# Patient Record
Sex: FEMALE | Race: WHITE | NOT HISPANIC OR LATINO | URBAN - METROPOLITAN AREA
[De-identification: names, ages, dates, MRNs, and addresses within clinical notes are randomized per-mention and may not be internally consistent; named-entity substitution may affect disease eponyms.]

---

## 2020-07-29 ENCOUNTER — OFFICE VISIT (OUTPATIENT)
Dept: URBAN - METROPOLITAN AREA CLINIC 33 | Facility: CLINIC | Age: 30
End: 2020-07-29

## 2020-08-11 ENCOUNTER — WEB ENCOUNTER (OUTPATIENT)
Dept: URBAN - METROPOLITAN AREA CLINIC 35 | Facility: CLINIC | Age: 30
End: 2020-08-11

## 2020-08-12 ENCOUNTER — OFFICE VISIT (OUTPATIENT)
Dept: URBAN - METROPOLITAN AREA CLINIC 33 | Facility: CLINIC | Age: 30
End: 2020-08-12

## 2020-08-12 VITALS — HEIGHT: 64 IN | BODY MASS INDEX: 21.17 KG/M2 | WEIGHT: 124 LBS

## 2020-08-12 RX ORDER — FAMOTIDINE 20 MG/1
1 TABLET TABLET, FILM COATED ORAL BID
Qty: 60 TABLET | Refills: 1 | OUTPATIENT
Start: 2020-08-12

## 2020-08-12 RX ORDER — CHOLECALCIFEROL (VITAMIN D3) 100000/G
AS DIRECTED POWDER (GRAM) MISCELLANEOUS
Status: ACTIVE | COMMUNITY

## 2020-08-12 RX ORDER — LORATADINE 10 MG/1
1 TABLET TABLET ORAL ONCE A DAY
Qty: 30 | Status: ACTIVE | COMMUNITY

## 2020-08-12 NOTE — HPI-MIGRATED HPI
;     Abdominal Pain : 30 year old female present today for consult of abdominal pain. Patient had symptoms starting 07/04/20 until 08/01/20.  Patient admits abdominal pain that is located upper epigastric region that would radiate to her back and is described as a burning pain lasting for about 4 hours each episode. Patient stated this occurs mainly at night time.  Patient denies any aggravating factors. Patient denies any alleviating factors.    1-2 BMs every other day, with normal to loose stool. She denies melena, blood, or mucus in stool.  She has tried Pepcid without relief of symptoms. Patient also admits trying Dexilant samples for few days with almost complete relief of her symptoms.   Patient denies the use of antibiotics within the last (3-6) months.  No hospital/ER visits for their pain.  Denies a family history of colon cancer or diseases/esophageal or gastric cancer or diseases.    CT ABD/Pelvis 1/8/2020. CT done for a different type of abdominal pain: No acute CT abnormality identified in lower chest, abdomen and pelvis. Prominent liver reaching the right iliac fossa but no liver lesions or abnormalities found. Probable superior polar renal parenchymal cyst. Degenerative changes in lower lumbar spine.   Labs 7/6/2020: WBC: 8.2, HGB: 14.1, HCT: 45.1, MCV: 88, MCH: 27.4, PLT: 438, CREAT: 1.09, CRP: 3, Sed Rate: 2 LFT's NL  Iron studies: NL, Ferritin: NL, LUKE: Negative, TSH: NL, T4 free: NL, Hepatic function: NL  MD Note:  July 4 th: emesis once after eating a sandwich then had diarrhea. Pain started then as well but pain then got better. July 9 th: saw PCP. It was thought initial symptoms were due to food poisoning  AT present time, patient feels well. No pain/symptoms for 1.5 weeks  No heartburn. No dysphagia. No fever with pain. No J/DU but stool was lighter at the time she was experiencing pain.;

## 2020-08-12 NOTE — EXAM-MIGRATED EXAMINATIONS
GENERAL APPEARANCE: - alert, in no acute distress, well developed, well nourished;   HEAD: - normocephalic, atraumatic;   EYES: - sclera anicteric bilaterally;   ORAL CAVITY: - mucosa moist;   THROAT: - clear;   NECK/THYROID: - neck supple, full range of motion, no cervical lymphadenopathy, no thyroid nodules, no thyromegaly, trachea midline;   SKIN: - no suspicious lesions, warm and dry, no spider angiomata, palmar erythema or icterus;   HEART: - no murmurs, regular rate and rhythm, S1, S2 normal;   LUNGS: - clear to auscultation bilaterally, good air movement, no wheezes, rales, rhonchi;   ABDOMEN: - bowel sounds present, no masses palpable, no organomegaly , no rebound tenderness, soft, nontender, nondistended;   MUSCULOSKELETAL: - normal posture, normal gait and station, no decreased range of motion;   EXTREMITIES: - no clubbing, cyanosis, or edema;   PSYCH: - cooperative with exam, mood/affect full range;

## 2020-08-22 ENCOUNTER — LAB OUTSIDE AN ENCOUNTER (OUTPATIENT)
Dept: URBAN - METROPOLITAN AREA CLINIC 35 | Facility: CLINIC | Age: 30
End: 2020-08-22

## 2020-08-27 LAB — H. PYLORI (EIA) - QDX: NEGATIVE

## 2020-08-28 ENCOUNTER — TELEPHONE ENCOUNTER (OUTPATIENT)
Dept: URBAN - METROPOLITAN AREA CLINIC 35 | Facility: CLINIC | Age: 30
End: 2020-08-28

## 2020-09-08 ENCOUNTER — TELEPHONE ENCOUNTER (OUTPATIENT)
Dept: URBAN - METROPOLITAN AREA CLINIC 35 | Facility: CLINIC | Age: 30
End: 2020-09-08

## 2020-09-09 ENCOUNTER — TELEPHONE ENCOUNTER (OUTPATIENT)
Dept: URBAN - METROPOLITAN AREA CLINIC 35 | Facility: CLINIC | Age: 30
End: 2020-09-09

## 2020-09-09 ENCOUNTER — OFFICE VISIT (OUTPATIENT)
Dept: URBAN - METROPOLITAN AREA CLINIC 33 | Facility: CLINIC | Age: 30
End: 2020-09-09

## 2020-09-09 VITALS — WEIGHT: 123 LBS | BODY MASS INDEX: 21 KG/M2 | HEIGHT: 64 IN

## 2020-09-09 PROBLEM — 83132003: Status: ACTIVE | Noted: 2020-08-12

## 2020-09-09 RX ORDER — LORATADINE 10 MG/1
1 TABLET TABLET ORAL ONCE A DAY
Qty: 30 | Status: ACTIVE | COMMUNITY

## 2020-09-09 RX ORDER — FAMOTIDINE 20 MG/1
1 TABLET TABLET, FILM COATED ORAL BID
Qty: 60 TABLET | Refills: 1 | Status: ON HOLD | COMMUNITY
Start: 2020-08-12

## 2020-09-09 RX ORDER — CHOLECALCIFEROL (VITAMIN D3) 100000/G
AS DIRECTED POWDER (GRAM) MISCELLANEOUS
Status: ACTIVE | COMMUNITY

## 2020-09-09 NOTE — HPI-MIGRATED HPI
;     Abdominal Pain : Patient presents for follow up of abdominal pain, imaging and labs. This is a Tele health Visit. Patient admits improvement of symptoms since last visit .  Patient denies taking Famotidine 20mg daily as advised during last visit. She completed both abdominal RUQ ultrasound and H. Pylori stool test with results noted below.   1-2 BM's per day, with soft stool. Patient admits some rectal itching.   She is currently on abx for sinus infection.    GI Note: epigastric pain is completely resolved. She is currently asymptomatic from upper GI standpoint. Stool for H Pylori was negative. RUQ US: showed normal size of liver with no focal abnormalities. GB and biliary tree were NL. Right kidney was described as mildly echogenic suggesting possibility of chronic medical renal disease. Regarding mention above of rectal itching, she is having regular BM's. Patient wonders if she may have hemorrhoids from pregnancy although does not feel them and has no other hemorrhoidal symptoms.   Of last visit (08/12/2020) 30 year old female present today for consult of abdominal pain. Patient had symptoms starting 07/04/20 until 08/01/20.  Patient admits abdominal pain that is located upper epigastric region that would radiate to her back and is described as a burning pain lasting for about 4 hours each episode. Patient stated this occurs mainly at night time.  Patient denies any aggravating factors. Patient denies any alleviating factors.    1-2 BMs every other day, with normal to loose stool. She denies melena, blood, or mucus in stool.  She has tried Pepcid without relief of symptoms. Patient also admits trying Dexilant samples for few days with almost complete relief of her symptoms.   Patient denies the use of antibiotics within the last (3-6) months.  No hospital/ER visits for their pain.  Denies a family history of colon cancer or diseases/esophageal or gastric cancer or diseases.    CT ABD/Pelvis 1/8/2020. CT done for a different type of abdominal pain: No acute CT abnormality identified in lower chest, abdomen and pelvis. Prominent liver reaching the right iliac fossa but no liver lesions or abnormalities found. Probable superior polar renal parenchymal cyst. Degenerative changes in lower lumbar spine.   Labs 7/6/2020: WBC: 8.2, HGB: 14.1, HCT: 45.1, MCV: 88, MCH: 27.4, PLT: 438, CREAT: 1.09, CRP: 3, Sed Rate: 2 LFT's NL  Iron studies: NL, Ferritin: NL, LUKE: Negative, TSH: NL, T4 free: NL, Hepatic function: NL  MD Note:  July 4 th: emesis once after eating a sandwich then had diarrhea. Pain started then as well but pain then got better. July 9 th: saw PCP. It was thought initial symptoms were due to food poisoning  AT present time, patient feels well. No pain/symptoms for 1.5 weeks  No heartburn. No dysphagia. No fever with pain. No J/DU but stool was lighter at the time she was experiencing pain.;

## 2021-05-05 ENCOUNTER — OFFICE VISIT (OUTPATIENT)
Dept: URBAN - METROPOLITAN AREA CLINIC 35 | Facility: CLINIC | Age: 31
End: 2021-05-05

## 2021-05-05 VITALS
BODY MASS INDEX: 20.66 KG/M2 | WEIGHT: 121 LBS | HEART RATE: 104 BPM | OXYGEN SATURATION: 99 % | DIASTOLIC BLOOD PRESSURE: 80 MMHG | SYSTOLIC BLOOD PRESSURE: 128 MMHG | HEIGHT: 64 IN

## 2021-05-05 RX ORDER — CHOLECALCIFEROL (VITAMIN D3) 100000/G
AS DIRECTED POWDER (GRAM) MISCELLANEOUS
Status: ON HOLD | COMMUNITY

## 2021-05-05 RX ORDER — FAMOTIDINE 20 MG/1
1 TABLET TABLET, FILM COATED ORAL BID
Qty: 60 TABLET | Refills: 1 | Status: ON HOLD | COMMUNITY
Start: 2020-08-12

## 2021-05-05 RX ORDER — LORATADINE 10 MG/1
1 TABLET TABLET ORAL ONCE A DAY
Qty: 30 | Status: ON HOLD | COMMUNITY

## 2021-05-05 NOTE — HPI-MIGRATED HPI
;   ;     Fecal Smearing : Patient presents today with new complaint of feal smearing. Onset 3 months ago, and typically occurs in alternating pattern. 1 week on and 1 week off. She states that stool is present in her underwear, and on the toilet paper when her wipes after urinating. Associated symptoms include rectal itching.   1 BM per day with medium formed stool. BM is of good quantity, she feels like she has completely emptied her bowels. No melena, blood, or mucus.   She is interested in food allergy testing due her eczema. She has not discussed this with her PCP.   US abd completed on 8.26.20: 1. The gallbladder shows a normal sonographic appearance. 2. The right kidney is mildly echogenic in appearance as noted , suggesting the possibility of chronic medical renal disease. No focal right renal abnormalities are seen. No obstructive uropathy. 3. Otherwise unremarkable right upper quadrant ultrasound . ;   Abdominal Pain : Abdominal pain has resolved.  Last visit 9.9.2020 Patient presents for follow up of abdominal pain, imaging and labs. This is a Tele health Visit. Patient admits improvement of symptoms since last visit .  Patient denies taking Famotidine 20mg daily as advised during last visit. She completed both abdominal RUQ ultrasound and H. Pylori stool test with results noted below.   1-2 BM's per day, with soft stool. Patient admits some rectal itching.   She is currently on abx for sinus infection.    GI Note: epigastric pain is completely resolved. She is currently asymptomatic from upper GI standpoint. Stool for H Pylori was negative. RUQ US: showed normal size of liver with no focal abnormalities. GB and biliary tree were NL. Right kidney was described as mildly echogenic suggesting possibility of chronic medical renal disease. Regarding mention above of rectal itching, she is having regular BM's. Patient wonders if she may have hemorrhoids from pregnancy although does not feel them and has no other hemorrhoidal symptoms.   ;

## 2021-05-05 NOTE — EXAM-MIGRATED EXAMINATIONS
GENERAL APPEARANCE: - alert, in no acute distress, well developed, well nourished;   HEAD: - normocephalic, atraumatic;   EYES: - sclera anicteric bilaterally;   HEART: - no murmurs, regular rate and rhythm, S1, S2 normal;   LUNGS: - clear to auscultation bilaterally, good air movement, no wheezes, rales, rhonchi;   ABDOMEN: - bowel sounds present, no masses palpable, no organomegaly , no rebound tenderness, soft, nontender, nondistended;   RECTAL: - No perianal lesions. No digital lesions palpated. No obvious hemorrhoids. Mildly decreased anal sphinter tone.;   EXTREMITIES: - no clubbing, cyanosis, or edema;   PSYCH: - cooperative with exam, mood/affect full range;

## 2021-06-10 ENCOUNTER — OFFICE VISIT (OUTPATIENT)
Dept: URBAN - METROPOLITAN AREA CLINIC 33 | Facility: CLINIC | Age: 31
End: 2021-06-10

## 2021-06-10 ENCOUNTER — DASHBOARD ENCOUNTERS (OUTPATIENT)
Age: 31
End: 2021-06-10

## 2021-06-10 VITALS
HEART RATE: 84 BPM | OXYGEN SATURATION: 98 % | SYSTOLIC BLOOD PRESSURE: 120 MMHG | BODY MASS INDEX: 21 KG/M2 | HEIGHT: 64 IN | WEIGHT: 123 LBS | DIASTOLIC BLOOD PRESSURE: 78 MMHG

## 2021-06-10 RX ORDER — FAMOTIDINE 20 MG/1
1 TABLET TABLET, FILM COATED ORAL BID
Qty: 60 TABLET | Refills: 1 | Status: ON HOLD | COMMUNITY
Start: 2020-08-12

## 2021-06-10 RX ORDER — CHOLECALCIFEROL (VITAMIN D3) 100000/G
AS DIRECTED POWDER (GRAM) MISCELLANEOUS
Status: ON HOLD | COMMUNITY

## 2021-06-10 RX ORDER — LORATADINE 10 MG/1
1 TABLET TABLET ORAL ONCE A DAY
Qty: 30 | Status: ON HOLD | COMMUNITY

## 2021-06-10 NOTE — HPI-MIGRATED HPI
;   ;     Fecal Smearing : Patient presents today for a f/u of fecal smearing. During her last visit she was instructed to start Citrucel BID. Patient took it BID for a while and now she is taking it once a day.  She admits currently taking Citrucel with complete improvement in symptoms. She also denies having any episodes of pruritus ani since her last visit. She was never able to find Balneol. Currently has 1 BM a day, stools are normal without blood, mucus or melena. She has no present concerns. There is no more fecal leakage.     Per last MD Note: Will reevaluate in 2 months. If no resolution, will make referral then for endoscopic US of anal canal.     Last visit 05/05/2021 Patient presents today with new complaint of feal smearing. Onset 3 months ago, and typically occurs in alternating pattern. 1 week on and 1 week off. She states that stool is present in her underwear, and on the toilet paper when her wipes after urinating. Associated symptoms include rectal itching.   1 BM per day with medium formed stool. BM is of good quantity, she feels like she has completely emptied her bowels. No melena, blood, or mucus.   She is interested in food allergy testing due her eczema. She has not discussed this with her PCP.   US abd completed on 8.26.20: 1. The gallbladder shows a normal sonographic appearance. 2. The right kidney is mildly echogenic in appearance as noted , suggesting the possibility of chronic medical renal disease. No focal right renal abnormalities are seen. No obstructive uropathy. 3. Otherwise unremarkable right upper quadrant ultrasound .;   Abdominal Pain : Abdominal pain has resolved.  Last visit 9.9.2020 Patient presents for follow up of abdominal pain, imaging and labs. This is a Tele health Visit. Patient admits improvement of symptoms since last visit .  Patient denies taking Famotidine 20mg daily as advised during last visit. She completed both abdominal RUQ ultrasound and H. Pylori stool test with results noted below.   1-2 BM's per day, with soft stool. Patient admits some rectal itching.   She is currently on abx for sinus infection.    GI Note: epigastric pain is completely resolved. She is currently asymptomatic from upper GI standpoint. Stool for H Pylori was negative. RUQ US: showed normal size of liver with no focal abnormalities. GB and biliary tree were NL. Right kidney was described as mildly echogenic suggesting possibility of chronic medical renal disease. Regarding mention above of rectal itching, she is having regular BM's. Patient wonders if she may have hemorrhoids from pregnancy although does not feel them and has no other hemorrhoidal symptoms.;

## 2021-07-06 ENCOUNTER — TELEPHONE ENCOUNTER (OUTPATIENT)
Dept: URBAN - METROPOLITAN AREA CLINIC 35 | Facility: CLINIC | Age: 31
End: 2021-07-06

## 2021-07-08 ENCOUNTER — LAB OUTSIDE AN ENCOUNTER (OUTPATIENT)
Dept: URBAN - METROPOLITAN AREA CLINIC 35 | Facility: CLINIC | Age: 31
End: 2021-07-08

## 2021-07-13 LAB
C. DIFFICILE TOXIN A/B, STOOL - QDX: (no result)
CALPROTECTIN, STOOL - QDX: (no result)
FECAL FAT, QUALITATIVE: (no result)
GASTROINTESTINAL PATHOGEN: (no result)
OVA AND PARASITE - QDX: (no result)
PANCREATICELASTASE ELISA, STOOL: (no result)

## 2021-07-15 ENCOUNTER — TELEPHONE ENCOUNTER (OUTPATIENT)
Dept: URBAN - METROPOLITAN AREA CLINIC 35 | Facility: CLINIC | Age: 31
End: 2021-07-15